# Patient Record
Sex: MALE | Race: WHITE
[De-identification: names, ages, dates, MRNs, and addresses within clinical notes are randomized per-mention and may not be internally consistent; named-entity substitution may affect disease eponyms.]

---

## 2019-01-02 ENCOUNTER — HOSPITAL ENCOUNTER (EMERGENCY)
Dept: HOSPITAL 14 - H.ER | Age: 34
Discharge: HOME | End: 2019-01-02
Payer: MEDICARE

## 2019-01-02 VITALS — OXYGEN SATURATION: 99 %

## 2019-01-02 VITALS
RESPIRATION RATE: 16 BRPM | HEART RATE: 101 BPM | TEMPERATURE: 99.9 F | SYSTOLIC BLOOD PRESSURE: 118 MMHG | DIASTOLIC BLOOD PRESSURE: 65 MMHG

## 2019-01-02 DIAGNOSIS — J11.1: Primary | ICD-10-CM

## 2019-01-02 NOTE — ED PDOC
HPI: Influenza


Time Seen by Provider: 01/02/19 07:39


Chief Complaint: Flu-like Symptoms


Chief Complaint (Provider): Sore Throat, Fever, Body Aches


History Per: Patient


Exam Limitations: no limitations


Onset/Duration Of Symptoms: Days (x1)


Additional complaint(s):: 


33 year old male presents to the ED for evaluation of sore throat, fever, and 

body aches since last night. Otherwise denies cough, abdominal pain, and 

vomiting.





PMD: Kyler Corbin





Past Medical History


Reviewed: Historical Data, Nursing Documentation, Vital Signs


Vital Signs: 


                                Last Vital Signs











Temp  99.0 F   01/02/19 07:27


 


Pulse  106 H  01/02/19 07:27


 


Resp  17   01/02/19 07:27


 


BP  106/65   01/02/19 07:27


 


Pulse Ox  99   01/02/19 07:27














- Medical History


Other PMH: vertigo





- Surgical History


Surgical History: Appendectomy





- Family History


Family History: States: Unknown Family Hx





- Social History


Current smoker - smoking cessation education provided: No


Ex-Smoker (has not smoked in the last 12 months): Yes


Alcohol: Social


Drugs: Denies





- Home Medications


Home Medications: 


                                Ambulatory Orders











 Medication  Instructions  Recorded


 


Meclizine [Meclizine*] 25 mg PO Q6 PRN #30 tab 06/20/16


 


Oseltamivir Cap [Tamiflu] 75 mg PO BID #10 cap 01/02/19














- Allergies


Allergies/Adverse Reactions: 


                                    Allergies











Allergy/AdvReac Type Severity Reaction Status Date / Time


 


No Known Allergies Allergy   Verified 06/20/16 10:36














Review of Systems


ROS Statement: Except As Marked, All Systems Reviewed And Found Negative


Constitutional: Positive for: Fever, Other (body aches)


ENT: Positive for: Throat Pain


Respiratory: Negative for: Cough


Gastrointestinal: Negative for: Vomiting, Abdominal Pain





Physical Exam





- Reviewed


Nursing Documentation Reviewed: Yes


Vital Signs Reviewed: Yes





- Physical Exam


Appears: Positive for: No Acute Distress


Head Exam: Positive for: ATRAUMATIC, NORMOCEPHALIC


Skin: Positive for: Normal Color, Warm


Eye Exam: Positive for: Normal appearance


ENT: Positive for: Pharyngeal Erythema.  Negative for: Tonsillar Exudate


Neck: Positive for: Normal, Painless ROM, Supple


Cardiovascular/Chest: Positive for: Regular Rate, Rhythm


Respiratory: Positive for: Normal Breath Sounds.  Negative for: Respiratory 

Distress


Gastrointestinal/Abdominal: Positive for: Normal Exam, Soft.  Negative for: 

Tenderness


Extremity: Positive for: Normal ROM


Neurologic/Psych: Positive for: Alert, Oriented (x3).  Negative for: 

Motor/Sensory Deficits





Medical Decision Making


Medical Decision Making: 


Time: 0745


Initial Impression: considering flu vs strep


Initial Plan: 


--Influenza A B swab


--Rapid strep swab





---------------------------------------------------------------

---------------------------------- 


Scribe Attestation:


Documented by Akiko Mcginnis, acting as a scribe for Max Faria MD.


Provider Scribe Attestation:


All medical record entries made by the Scribe were at my direction and 

personally dictated by me. I have reviewed the chart and agree that the record 

accurately reflects my personal performance of the history, physical exam, 

medical decision making, and the department course for this patient. I have also

 personally directed, reviewed, and agree with the discharge instructions and 

disposition.





- ECG


O2 Sat by Pulse Oximetry: 99 (RA)


Pulse Ox Interpretation: Normal





Disposition





- Clinical Impression


Clinical Impression: 


 Influenza








- Patient ED Disposition


Is Patient to be Admitted: No


Counseled Patient/Family Regarding: Studies Performed, Diagnosis, Need For 

Followup, Rx Given





- Disposition


Referrals: 


[facility] [Outside]


Disposition: Routine/Home


Disposition Time: 08:58


Condition: FAIR


Prescriptions: 


Oseltamivir Cap [Tamiflu] 75 mg PO BID #10 cap


Instructions:  Flu


Forms:  [employer] (English)

## 2024-01-30 ENCOUNTER — EMERGENCY (EMERGENCY)
Facility: HOSPITAL | Age: 39
LOS: 1 days | Discharge: HOME | End: 2024-01-30
Attending: EMERGENCY MEDICINE | Admitting: EMERGENCY MEDICINE
Payer: MEDICARE

## 2024-01-30 VITALS
TEMPERATURE: 99 F | DIASTOLIC BLOOD PRESSURE: 83 MMHG | OXYGEN SATURATION: 99 % | SYSTOLIC BLOOD PRESSURE: 134 MMHG | HEART RATE: 82 BPM | RESPIRATION RATE: 18 BRPM

## 2024-01-30 VITALS
HEART RATE: 85 BPM | SYSTOLIC BLOOD PRESSURE: 120 MMHG | OXYGEN SATURATION: 100 % | TEMPERATURE: 99 F | DIASTOLIC BLOOD PRESSURE: 75 MMHG | RESPIRATION RATE: 18 BRPM

## 2024-01-30 DIAGNOSIS — H52.203 UNSPECIFIED ASTIGMATISM, BILATERAL: Chronic | ICD-10-CM

## 2024-01-30 PROCEDURE — 99284 EMERGENCY DEPT VISIT MOD MDM: CPT

## 2024-01-30 RX ORDER — IBUPROFEN 200 MG
600 TABLET ORAL ONCE
Refills: 0 | Status: COMPLETED | OUTPATIENT
Start: 2024-01-30 | End: 2024-01-30

## 2024-01-30 RX ADMIN — Medication 600 MILLIGRAM(S): at 12:21

## 2024-01-30 NOTE — ED PROVIDER NOTE - PATIENT PORTAL LINK FT
You can access the FollowMyHealth Patient Portal offered by [facility] by registering at the following website: [email or web address]. By joining [employer]’s FollowMyHealth portal, you will also be able to view your health information using other applications (apps) compatible with our system.

## 2024-01-30 NOTE — ED PROVIDER NOTE - NSPTACCESSSVCSAPPTDETAILS_ED_ALL_ED_FT
3 days of acute-onset left-sided throat pain, no evidence of infection now, but recently had otitis media; follow up within 1 week; thank you!

## 2024-01-30 NOTE — ED PROVIDER NOTE - TEST CONSIDERED BUT NOT PERFORMED
Tests Considered But Not Performed Considered imaging (x-ray or CT), however patient has no evidence of airway compromise, space-occupying lesion, retained foreign body, mass, or infection; low utility for imaging at this time, would be better for patient to have direct visualization by ENT in office

## 2024-01-30 NOTE — ED ADULT NURSE NOTE - OBJECTIVE STATEMENT
Patient received in wellness, exam room 2. Patient A&Ox3 and ambulatory at baseline. Patient presents to the ED c/o throat and left sided neck/ear pain for 3 days. phx visual impairment. Patient states for 3 days, he has been experiencing a sore throat and left sided neck/ear tenderness. Patient denies recent trauma. Patient denies headache, dizziness, lightheadedness, nausea/vomiting, fever/chills. Patient offers no complaints at this time. Vital signs stable, airway patent, respirations even and unlabored, no signs/symptoms of acute distress; patient denies dyspnea, shortness of breath, and chest pain. Patient is stable at this time. Steady gait observed.

## 2024-01-30 NOTE — ED PROVIDER NOTE - CLINICAL SUMMARY MEDICAL DECISION MAKING FREE TEXT BOX
patient in the emergency department with throat pain, recent treatment for otitis media; no evidence of worsening otitis media, mastoiditis, throat infection, space-occupying lesion, abscess; unclear etiology of patient's symptoms, however there is no airway compromise, pooling of secretions, or other concerning symptoms at this time; patient is stable and in no acute distress, will recommend oral pain control and outpatient ENT follow-up, which we will help arrange

## 2024-01-30 NOTE — ED PROVIDER NOTE - DIFFERENTIAL DIAGNOSIS
pharyngitis, ?branchial cleft cyst pain, unlikely retained foreign body as no history of ingestion of bones as per pt; not consistent with mastoiditis or complication of pt's recent otitis media; no evidence of airway compromise, Elias's angina or dental infection Differential Diagnosis

## 2024-01-30 NOTE — ED PROVIDER NOTE - NSFOLLOWUPINSTRUCTIONS_ED_ALL_ED_FT
You were seen in the emergency department for pain in your throat.      There is no evidence of infection or any interference with your breathing, however it is very important that you follow-up with an ear, nose and throat specialist.  The discharge center will call you in the next few days to help you schedule an appointment.      In the meantime, you can begin taking ibuprofen for pain, as directed on packaging.  You can buy over-the-counter ibuprofen at the pharmacy without a prescription.    You should finish the antibiotics that you have already been given for your ear infection.    1. TAKE ALL OF YOUR PRESCRIBED OR OVER THE COUNTER MEDICATIONS AS DIRECTED.    2. FOR PAIN OR FEVER YOU CAN TAKE IBUPROFEN (MOTRIN, ADVIL), NAPROXEN (ALLEVE) OR ACETAMINOPHEN (TYLENOL) AS NEEDED, AS DIRECTED ON PACKAGING.  3. FOLLOW UP WITH YOUR PRIMARY DOCTOR WITHIN 5 DAYS, OR SOONER IF DIRECTED.  4. IF YOU HAD LABS OR IMAGING DONE, YOU WERE GIVEN COPIES OF ALL LABS AND/OR IMAGING RESULTS FROM YOUR ER VISIT--PLEASE TAKE THEM WITH YOU TO YOUR FOLLOW UP APPOINTMENTS.  5. IF NEEDED, CALL PATIENT ACCESS SERVICES AT [phone] TO FIND A PRIMARY CARE PHYSICIAN.  OR CALL [phone] TO MAKE AN APPOINTMENT WITH THE CLINIC.  6. YOU CAN FIND PHYSICIANS OF ALL SPECIALITIES BY VISITING [facility] AND CLICKING ON "FIND A DOCTOR".  7. RETURN TO THE ER FOR ANY WORSENING SYMPTOMS OR CONCERNS.    THANK YOU FOR COMING TO [facility].  HAVE A NICE DAY.

## 2024-01-30 NOTE — ED PROVIDER NOTE - PHYSICAL EXAMINATION
Oral cavity normal appearing and patent, teeth intact, uvula midline, tongue midline without elevation.  Tonsils are normal appearing and without exudates.  Speech normal.  Nontender mastoids bilaterally.    trachea midline  normal speech  no stridor  normal handling of secretions    external throat normal appearing, no masses palpated    normal TMs bilaterally (day 6/7 of abx for right otitis media)

## 2024-01-30 NOTE — ED PROVIDER NOTE - OBJECTIVE STATEMENT
Dr. Christine: This is a 38-year-old male with past medical history of migraines, legally blind due to astigmatism, in the emergency department with 3 days of acute onset left sided throat pain.  Patient states that today is day 6 of 7 of antibiotics for right otitis media, feels like symptoms of otitis have been improving significantly, however 3 days ago after swallowing saliva patient began to develop pain in the left side of throat.  Pain has been constant for 3 days, somewhat worse when he swallows, however he is still able to eat and drink and speak normally.  No issues swallowing saliva.  He denies any recent intake of food with bones that occurred prior to pain beginning.  He has not taken anything for the pain. Dr. Christine: This is a 38-year-old male with past medical history of migraines, legally blind due to astigmatism, in the emergency department with 3 days of acute onset left sided throat pain.  Patient states that today is day 6 of 7 of antibiotics for right otitis media, feels like symptoms of otitis have been improving significantly, however 3 days ago after swallowing saliva patient began to develop pain in the left side of throat.  Pain has been constant for 3 days, somewhat worse when he swallows, however he is still able to eat and drink and speak normally.  No issues swallowing saliva.  He denies any recent intake of food with bones that occurred prior to pain beginning.  He has not taken anything for the pain.  No CP/SOB, N/V/D, fever, abdominal pain or other complaints.

## 2024-02-28 ENCOUNTER — APPOINTMENT (OUTPATIENT)
Dept: OTOLARYNGOLOGY | Facility: CLINIC | Age: 39
End: 2024-02-28

## 2025-06-24 ENCOUNTER — NON-APPOINTMENT (OUTPATIENT)
Age: 40
End: 2025-06-24

## 2025-06-25 ENCOUNTER — NON-APPOINTMENT (OUTPATIENT)
Age: 40
End: 2025-06-25

## 2025-06-27 ENCOUNTER — NON-APPOINTMENT (OUTPATIENT)
Age: 40
End: 2025-06-27

## 2025-06-27 ENCOUNTER — APPOINTMENT (OUTPATIENT)
Dept: ORTHOPEDIC SURGERY | Facility: CLINIC | Age: 40
End: 2025-06-27
Payer: OTHER MISCELLANEOUS

## 2025-06-27 VITALS — BODY MASS INDEX: 23.66 KG/M2 | WEIGHT: 142 LBS | HEIGHT: 65 IN

## 2025-06-27 PROBLEM — M25.531 RIGHT WRIST PAIN: Status: ACTIVE | Noted: 2025-06-27

## 2025-06-27 PROBLEM — M79.643 TENDERNESS OF ANATOMICAL SNUFFBOX: Status: ACTIVE | Noted: 2025-06-27

## 2025-06-27 PROCEDURE — 99203 OFFICE O/P NEW LOW 30 MIN: CPT

## 2025-07-03 ENCOUNTER — APPOINTMENT (OUTPATIENT)
Dept: ORTHOPEDIC SURGERY | Facility: CLINIC | Age: 40
End: 2025-07-03

## 2025-07-03 VITALS — WEIGHT: 142 LBS | BODY MASS INDEX: 27.88 KG/M2 | HEIGHT: 60 IN

## 2025-07-03 PROBLEM — M25.571 ACUTE RIGHT ANKLE PAIN: Status: ACTIVE | Noted: 2025-07-03

## 2025-07-03 PROBLEM — S96.911A STRAIN OF RIGHT FOOT, INITIAL ENCOUNTER: Status: ACTIVE | Noted: 2025-07-03

## 2025-07-03 PROCEDURE — 73600 X-RAY EXAM OF ANKLE: CPT | Mod: RT

## 2025-07-03 PROCEDURE — 73630 X-RAY EXAM OF FOOT: CPT | Mod: RT

## 2025-07-03 PROCEDURE — 99204 OFFICE O/P NEW MOD 45 MIN: CPT

## 2025-07-08 PROBLEM — M62.461 GASTROCNEMIUS EQUINUS OF RIGHT LOWER EXTREMITY: Status: ACTIVE | Noted: 2025-07-08

## 2025-07-08 PROBLEM — M21.41 ACQUIRED PES PLANUS OF RIGHT FOOT: Status: ACTIVE | Noted: 2025-07-08

## 2025-07-08 PROBLEM — M94.279 CHONDROMALACIA OF ANKLE OR JOINT OF FOOT: Status: ACTIVE | Noted: 2025-07-08

## 2025-07-22 ENCOUNTER — APPOINTMENT (OUTPATIENT)
Dept: ORTHOPEDIC SURGERY | Facility: CLINIC | Age: 40
End: 2025-07-22

## 2025-07-22 VITALS — HEIGHT: 66 IN | BODY MASS INDEX: 22.98 KG/M2 | WEIGHT: 143 LBS

## 2025-07-22 DIAGNOSIS — M18.11 UNILATERAL PRIMARY OSTEOARTHRITIS OF FIRST CARPOMETACARPAL JOINT, RIGHT HAND: ICD-10-CM

## 2025-07-22 PROCEDURE — 99214 OFFICE O/P EST MOD 30 MIN: CPT | Mod: 25

## 2025-07-22 PROCEDURE — 20605 DRAIN/INJ JOINT/BURSA W/O US: CPT | Mod: RT

## 2025-08-05 ENCOUNTER — APPOINTMENT (OUTPATIENT)
Dept: ORTHOPEDIC SURGERY | Facility: CLINIC | Age: 40
End: 2025-08-05

## 2025-08-05 RX ORDER — MELOXICAM 15 MG/1
15 TABLET ORAL DAILY
Qty: 30 | Refills: 6 | Status: ACTIVE | COMMUNITY
Start: 2025-08-05 | End: 1900-01-01

## 2025-08-19 ENCOUNTER — NON-APPOINTMENT (OUTPATIENT)
Age: 40
End: 2025-08-19

## 2025-08-19 ENCOUNTER — APPOINTMENT (OUTPATIENT)
Dept: ORTHOPEDIC SURGERY | Facility: CLINIC | Age: 40
End: 2025-08-19
Payer: OTHER MISCELLANEOUS

## 2025-08-19 DIAGNOSIS — M19.031 PRIMARY OSTEOARTHRITIS, RIGHT WRIST: ICD-10-CM

## 2025-08-19 PROCEDURE — 99214 OFFICE O/P EST MOD 30 MIN: CPT | Mod: 25

## 2025-08-26 ENCOUNTER — APPOINTMENT (OUTPATIENT)
Dept: ORTHOPEDIC SURGERY | Facility: CLINIC | Age: 40
End: 2025-08-26
Payer: OTHER MISCELLANEOUS

## 2025-08-26 DIAGNOSIS — M18.11 UNILATERAL PRIMARY OSTEOARTHRITIS OF FIRST CARPOMETACARPAL JOINT, RIGHT HAND: ICD-10-CM

## 2025-08-26 PROCEDURE — 99203 OFFICE O/P NEW LOW 30 MIN: CPT | Mod: 25

## 2025-08-26 PROCEDURE — 20604 DRAIN/INJ JOINT/BURSA W/US: CPT | Mod: RT

## 2025-09-09 ENCOUNTER — APPOINTMENT (OUTPATIENT)
Dept: ORTHOPEDIC SURGERY | Facility: CLINIC | Age: 40
End: 2025-09-09

## 2025-09-09 PROCEDURE — 20604 DRAIN/INJ JOINT/BURSA W/US: CPT | Mod: RT

## 2025-09-09 PROCEDURE — 99213 OFFICE O/P EST LOW 20 MIN: CPT | Mod: 25
